# Patient Record
Sex: MALE
[De-identification: names, ages, dates, MRNs, and addresses within clinical notes are randomized per-mention and may not be internally consistent; named-entity substitution may affect disease eponyms.]

---

## 2018-09-11 ENCOUNTER — HOSPITAL ENCOUNTER (EMERGENCY)
Dept: HOSPITAL 5 - ED | Age: 38
LOS: 1 days | Discharge: HOME | End: 2018-09-12
Payer: SELF-PAY

## 2018-09-11 DIAGNOSIS — M10.9: Primary | ICD-10-CM

## 2018-09-11 DIAGNOSIS — F17.200: ICD-10-CM

## 2018-09-11 DIAGNOSIS — Z88.6: ICD-10-CM

## 2018-09-11 LAB
ALBUMIN SERPL-MCNC: 4.2 G/DL (ref 3.9–5)
ALT SERPL-CCNC: 26 UNITS/L (ref 7–56)
APTT BLD: 26 SEC. (ref 24.2–36.6)
BUN SERPL-MCNC: 18 MG/DL (ref 9–20)
BUN/CREAT SERPL: 9 %
CALCIUM SERPL-MCNC: 9.5 MG/DL (ref 8.4–10.2)
HCT VFR BLD CALC: 41.7 % (ref 35.5–45.6)
HEMOLYSIS INDEX: 0
HGB BLD-MCNC: 14.2 GM/DL (ref 11.8–15.2)
INR PPP: 1.04 (ref 0.87–1.13)
MCH RBC QN AUTO: 29 PG (ref 28–32)
MCHC RBC AUTO-ENTMCNC: 34 % (ref 32–34)
MCV RBC AUTO: 86 FL (ref 84–94)
PLATELET # BLD: 363 K/MM3 (ref 140–440)
RBC # BLD AUTO: 4.86 M/MM3 (ref 3.65–5.03)

## 2018-09-11 PROCEDURE — 85610 PROTHROMBIN TIME: CPT

## 2018-09-11 PROCEDURE — 85027 COMPLETE CBC AUTOMATED: CPT

## 2018-09-11 PROCEDURE — 96372 THER/PROPH/DIAG INJ SC/IM: CPT

## 2018-09-11 PROCEDURE — 99283 EMERGENCY DEPT VISIT LOW MDM: CPT

## 2018-09-11 PROCEDURE — 85730 THROMBOPLASTIN TIME PARTIAL: CPT

## 2018-09-11 PROCEDURE — 36415 COLL VENOUS BLD VENIPUNCTURE: CPT

## 2018-09-11 PROCEDURE — 80053 COMPREHEN METABOLIC PANEL: CPT

## 2018-09-11 PROCEDURE — 84550 ASSAY OF BLOOD/URIC ACID: CPT

## 2018-09-12 VITALS — DIASTOLIC BLOOD PRESSURE: 78 MMHG | SYSTOLIC BLOOD PRESSURE: 138 MMHG

## 2018-09-12 LAB — URATE SERPL-MCNC: 9.4 MG/DL (ref 3.5–7.6)

## 2018-09-12 NOTE — EMERGENCY DEPARTMENT REPORT
HPI





- General


Chief Complaint: Extremity Problem,Nontraumatic


Time Seen by Provider: 09/12/18 04:36





- [facility]


HPI: 





Desai 21





The patient is a 37-year-old male presenting with a chief complaint of podagra.

  The patient has a history of gout and states for the past week he's had pain 

in his right great toe consistent with his previous bouts of gout.  Patient 

states the pain is worsening now to the point where it is difficult for him to 

walk.  The patient gives his pain a score of 10/10





Location: Right great toe


Duration: 1 week


Quality: Gouty arthritis


Severity: 10/10


Modifying factors: [see above]


Context: [see above]


Mode of transportation: [not driving]





ED Past Medical Hx





- Past Medical History


Previous Medical History?: Yes


Hx Renal Disease: Yes (renal insufficiency with remaining kidney)


Hx of Cancer: Yes (renal)


Additional medical history: gout.  PE





- Surgical History


Past Surgical History?: Yes


Additional Surgical History: left nephrectomy





- Family History


Family history: no significant





- Social History


Smoking Status: Light Tobacco Smoker


Substance Use Type: None





- Medications


Home Medications: 


 Home Medications











 Medication  Instructions  Recorded  Confirmed  Last Taken  Type


 


HYDROcodone/APAP 5-325 [[place] 1 - 2 each PO Q6HR PRN #14 tablet 09/12/18  

Unknown Rx





5/325]     














ED Review of Systems


ROS: 


Stated complaint: RT TOE PAIN


Other details as noted in HPI





Constitutional: no symptoms reported


Eyes: denies: eye pain


ENT: denies: throat pain


Respiratory: no symptoms reported


Cardiovascular: denies: chest pain


Endocrine: no symptoms reported


Gastrointestinal: denies: abdominal pain


Genitourinary: denies: dysuria


Musculoskeletal: arthralgia


Neurological: denies: headache





Physical Exam





- Physical Exam


Vital Signs: 


 Vital Signs











  09/11/18 09/12/18





  22:43 03:35


 


Temperature 99.1 F 98.7 F


 


Pulse Rate 100 H 90


 


Respiratory 22 20





Rate  


 


Blood Pressure 140/93 145/99


 


O2 Sat by Pulse 97 96





Oximetry  











Physical Exam: 





GENERAL: The patient is well-developed well-nourished male sitting on stretcher 

not appearing to be in acute distress. []


HEENT: Normocephalic.  Atraumatic.  Extraocular motions are intact.


NECK: Supple.  Trachea midline


CHEST/LUNGS: There is no respiratory distress noted.


HEART/CARDIOVASCULAR: Regular.  There is no tachycardia. 


ABDOMEN: Abdomen is soft, nontender.  Patient has normal bowel sounds.  There 

is no abdominal distention.


SKIN: There is no rash.  There is slight edema of the right foot.  There is no 

diaphoresis.  No cellulitis appreciated


NEURO: The patient is awake, alert, and oriented.  The patient is cooperative.  

The patient has normal speech


MUSCULOSKELETAL: There is pain and tenderness of the right great toe





ED Course


 Vital Signs











  09/11/18 09/12/18





  22:43 03:35


 


Temperature 99.1 F 98.7 F


 


Pulse Rate 100 H 90


 


Respiratory 22 20





Rate  


 


Blood Pressure 140/93 145/99


 


O2 Sat by Pulse 97 96





Oximetry  














ED Medical Decision Making





- Lab Data


Result diagrams: 


 09/11/18 22:56





 09/11/18 22:56





 Laboratory Tests











  09/11/18 09/11/18 09/11/18





  22:56 22:56 22:56


 


WBC  10.4  


 


RBC  4.86  


 


Hgb  14.2  


 


Hct  41.7  


 


MCV  86  


 


MCH  29  


 


MCHC  34  


 


RDW  14.4  


 


Plt Count  363  


 


PT    14.1


 


INR    1.04


 


APTT    26.0


 


Sodium   137 


 


Potassium   4.2 


 


Chloride   99.5 


 


Carbon Dioxide   22 


 


Anion Gap   20 


 


BUN   18 


 


Creatinine   2.1 H 


 


Estimated GFR   36 


 


BUN/Creatinine Ratio   9 


 


Glucose   133 H 


 


Uric Acid   9.4 H 


 


Calcium   9.5 


 


Total Bilirubin   0.50 


 


AST   20 


 


ALT   26 


 


Alkaline Phosphatase   48 


 


Total Protein   7.8 


 


Albumin   4.2 


 


Albumin/Globulin Ratio   1.2 














- Differential Diagnosis


gouty arthritis


Critical care attestation.: 


If time is entered above; I have spent that time in minutes in the direct care 

of this critically ill patient, excluding procedure time.








ED Disposition


Clinical Impression: 


 Podagra, Toe pain, right





Disposition: DC-01 TO HOME OR SELFCARE


Is pt being admited?: No


Does the pt Need Aspirin: No


Condition: Stable


Instructions:  Arthralgia (ED)


Additional Instructions: 


Today you're creatinine was found to be 2.1 mg/dL.  It is important that you 

follow up with your kidney specialist for further evaluation.  Return to the 

emergency department immediately should you develop worsening symptoms, fever, 

inability to tolerate food or liquid or any other concerns.


Prescriptions: 


HYDROcodone/APAP 5-325 [[place] 5/325] 1 - 2 each PO Q6HR PRN #14 tablet


 PRN Reason: Pain


Referrals: 


PRIMARY CARE,MD [Primary Care Provider] - 3-5 Days


Time of Disposition: 04:50